# Patient Record
(demographics unavailable — no encounter records)

---

## 2024-10-08 NOTE — DISCUSSION/SUMMARY
[Please See Note in Chart and Documentation in Trial DB] : Please see note in chart and documentation in Trial DB. [Patient seen for WTC Monitoring ___] : Patient was seen for WTC monitoring [unfilled] [FreeTextEntry3] : ID [id number].   HPI: 50 yo male presents for annual visit, v8.   Pt has no acute complaints.   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 Occupational Hx:  [employer] [employer]; retired [employer] 2016  PMH/PSH: DM2, HTN, dyslipidemia, adrenal adenoma (1.8 cm on CT chest 10/2021) Family Hx: mother had 4 TIAs Allergies: NKDA Meds: see above Social Hx: former smoker, on and off 5-6 years, quit 11 years ago  Review of Systems: IAMQ reviewed with patient  PE: See Trial DB   Results: - CXR: Oct 2023, normal  - Spirometry: reviewed  A/P: - cbc, cmp, lipids, UA  - CXR OCT 2023  - spirometry reviewed, no respiratory complaints  - Flu shot declined - had cologuard last year, reportedly negative, advised pt to call office if he would like colonoscopy in the future through [facility] program - Follow up with endo for management of adrenal adenoma (sees twice a year) - RTC in 1 year or sooner if needed